# Patient Record
Sex: FEMALE | Race: WHITE
[De-identification: names, ages, dates, MRNs, and addresses within clinical notes are randomized per-mention and may not be internally consistent; named-entity substitution may affect disease eponyms.]

---

## 2017-01-16 ENCOUNTER — HOSPITAL ENCOUNTER (OUTPATIENT)
Dept: HOSPITAL 59 - HOP | Age: 67
Discharge: HOME | End: 2017-01-16
Attending: INTERNAL MEDICINE
Payer: COMMERCIAL

## 2017-01-16 DIAGNOSIS — Z86.010: Primary | ICD-10-CM

## 2017-01-16 DIAGNOSIS — I10: ICD-10-CM

## 2017-01-16 DIAGNOSIS — K57.30: ICD-10-CM

## 2017-01-16 DIAGNOSIS — D12.5: ICD-10-CM

## 2017-01-16 DIAGNOSIS — E78.00: ICD-10-CM

## 2017-01-16 DIAGNOSIS — D12.4: ICD-10-CM

## 2017-01-16 DIAGNOSIS — E03.9: ICD-10-CM

## 2017-01-16 DIAGNOSIS — D12.7: ICD-10-CM

## 2017-01-18 NOTE — OPERATIVE NOTE
DATE OF SURGERY:  01/16/2017.



REFERRING PHYSICIAN:  Analilia Montalvo D.O. 



PROCEDURE:  Colonoscopy to the cecum with cold biopsy forceps polypectomy times 
three.



INDICATION:  Prior history of adenomatous polyps.  The patient also has a 
strong family history of colon cancer in her father and her grandmother.



ANESTHESIA:  Intravenous sedation was administered by the Department of 
Anesthesiology and included Diprivan titrated to effect.



PROCEDURE:  Following informed consent from this alert individual, including a 
discussion of the risks and benefits of the procedure and an opportunity for 
the patient to ask questions, the patient was in the left lateral decubitus 
position.  A digital rectal exam was performed.  No abnormalities were noted.  
Following this, the Olympus PCF-180 video colonoscope was inserted into the 
rectum without resistance.  The rectal mucosa has a normal appearance with 
normal folds and distensibility.  The colonoscope was advanced up through the 
colon to the level of the cecum without much difficulty.  Throughout the bowel, 
the mucosa appeared normal, the folds were normal and the bowel was fairly well 
distensible.  Scattered diverticula were noted in the sigmoid colon.  The cecum 
was well defined by noting the appendiceal orifice and ileocecal valve.  
Retroflexion in the cecum was unremarkable.  The colon preparation was good.  
From the base of the cecum, the colonoscope was then withdrawn.  Again, 
diverticulosis was noted in the sigmoid colon.  There were also three 
diminutive 3.0 mm polyps noted in the descending colon, sigmoid colon, and 
rectum.  Each was removed with application of cold biopsy forceps.  
Retroflexion in the rectum was endoscopically normal.  The endoscope was 
straightened and withdrawn.  The patient tolerated the procedure well and was 
returned to the recovery area in stable condition.   



IMPRESSION: 

1.  Three diminutive 3.0 mm polyps removed from the descending colon, sigmoid 
colon, and rectum, each with biopsy forceps.

2.  Sigmoid diverticulosis. 



RECOMMENDATIONS:  

The patient was advised to have a recheck colonoscopy in three years' time for 
polyp surveillance and family history of colon cancer.  Follow up will also be 
with Dr. Analiila Montalvo.







________________________________   __________________________________

JIM CRAIG D.O.      Date      Time



cc:  ANUSHA Brewer

## 2019-09-04 ENCOUNTER — HOSPITAL ENCOUNTER (EMERGENCY)
Dept: HOSPITAL 59 - ER | Age: 69
Discharge: HOME | End: 2019-09-04
Payer: MEDICARE

## 2019-09-04 DIAGNOSIS — K57.92: Primary | ICD-10-CM

## 2019-09-04 DIAGNOSIS — I10: ICD-10-CM

## 2019-09-04 LAB
ABSOLUTE NEUTROPHIL COUNT: 5.51
ALBUMIN SERPL-MCNC: 4.2 G/DL (ref 4–5)
ALP SERPL-CCNC: 74 U/L (ref 35–104)
ALT SERPL-CCNC: 16 U/L (ref ?–33)
ANION GAP SERPL CALC-SCNC: 17 MMOL/L (ref 7–16)
APPEARANCE UR: (no result)
AST SERPL-CCNC: 20 U/L (ref 10–35)
BASOPHILS NFR BLD: 0.4 % (ref 0–6)
BILIRUB DIRECT SERPL-MCNC: < 0.2 MG/DL (ref 0–0.3)
BILIRUB SERPL-MCNC: 0.7 MG/DL (ref 0.2–1)
BILIRUB UR-MCNC: (no result) MG/DL
BUN SERPL-MCNC: 11 MG/DL (ref 8–23)
CO2 SERPL-SCNC: 25 MMOL/L (ref 22–29)
COLOR UR: (no result)
CREAT SERPL-MCNC: 0.5 MG/DL (ref 0.5–0.9)
EOSINOPHIL NFR BLD: 2.9 % (ref 0–6)
ERYTHROCYTE [DISTWIDTH] IN BLOOD BY AUTOMATED COUNT: 18.6 % (ref 11.5–14.5)
EST GLOMERULAR FILTRATION RATE: > 60 ML/MIN
GLUCOSE SERPL-MCNC: 93 MG/DL (ref 74–109)
GLUCOSE UR STRIP-MCNC: NEGATIVE MG/DL
GRANULOCYTES NFR BLD: 70 % (ref 47–80)
HCT VFR BLD CALC: 48.3 % (ref 35–47)
HGB BLD-MCNC: 14.9 GM/DL (ref 11.6–16)
KETONES UR QL STRIP: NEGATIVE
LIPASE SERPL-CCNC: 21 U/L (ref 13–60)
LYMPHOCYTES NFR BLD AUTO: 15.9 % (ref 16–45)
MCH RBC QN AUTO: 26.3 PG (ref 27–33)
MCHC RBC AUTO-ENTMCNC: 30.8 G/DL (ref 32–36)
MCV RBC AUTO: 85.3 FL (ref 81–97)
MONOCYTES NFR BLD: 10.8 % (ref 0–9)
NITRITE UR QL STRIP: NEGATIVE
PLATELET # BLD: 248 K/UL (ref 130–400)
PMV BLD AUTO: 10.9 FL (ref 7.4–10.4)
PROT SERPL-MCNC: 7.1 G/DL (ref 6.6–8.7)
PROT UR QL STRIP: (no result)
RBC # BLD AUTO: 5.66 M/UL (ref 3.8–5.4)
RBC # UR STRIP: NEGATIVE /UL
URINE LEUKOCYTE ESTERASE: NEGATIVE
UROBILINOGEN UR STRIP-ACNC: 0.2 E.U./DL (ref 0.2–1)
WBC # BLD AUTO: 7.9 K/UL (ref 4.2–12.2)

## 2019-09-04 PROCEDURE — 81003 URINALYSIS AUTO W/O SCOPE: CPT

## 2019-09-04 PROCEDURE — 80076 HEPATIC FUNCTION PANEL: CPT

## 2019-09-04 PROCEDURE — 80048 BASIC METABOLIC PNL TOTAL CA: CPT

## 2019-09-04 PROCEDURE — 74176 CT ABD & PELVIS W/O CONTRAST: CPT

## 2019-09-04 PROCEDURE — 99284 EMERGENCY DEPT VISIT MOD MDM: CPT

## 2019-09-04 PROCEDURE — 85025 COMPLETE CBC W/AUTO DIFF WBC: CPT

## 2019-09-04 PROCEDURE — 83690 ASSAY OF LIPASE: CPT

## 2019-09-04 NOTE — EMERGENCY DEPARTMENT RECORD
History of Present Illness





- General


Chief Complaint: Abdominal Pain


Stated Complaint: ABD  PAIN


Time Seen by Provider: 19 15:25


Source: Patient, RN notes reviewed


Mode of Arrival: Wheelchair





- History of Present Illness


Initial Comments: 


patient has left lower quad pain aug 6 and [facility] and given [facility]vaquin

and flagyl and was better and still had a twing of pain and started to get worse

a few days ago and she [facility] and [facility] the

ED today.  No vomiting and no diarrhea , no dysuria,  PSH Hysterectomy, History 

of diverticulitis and GI Dr is Dr Sutton . Primary is Dr Adams in [place]





Onset/Timin


-: Month(s)


Location: [facility]


Severity: Moderate


Severity scale (1-10): 8


Quality: Sharp


Consistency: Intermittent


Improves With: Rest


Worsens With: Movement


Associated Symptoms: Denies other symptoms





- Related Data


                                Home Medications











 Medication  Instructions  Recorded  Confirmed  Last Taken


 


Celecoxib 200 mg PO DAILY 19


 


Hydroxychloroquine Sulfate 200 mg PO DAILY 19





[Plaquenil]    








                                  Previous Rx's











 Medication  Instructions  Recorded


 


Ciprofloxacin HCl [Cipro] 500 mg PO Q12HR #20 tablet 19


 


Dicyclomine HCl [Bentyl] 10 mg PO Q8H #15 cap 19


 


Metronidazole [Flagyl] 500 mg PO Q8H #30 tablet 19











                                    Allergies











Allergy/AdvReac Type Severity Reaction Status Date / Time


 


No Known Drug Allergies Allergy   Verified 19 15:16














Travel Screening





- Travel/Exposure Within Last 30 Days


Have you traveled within the last 30 days?: No





- Travel/Exposure Within Last Year


Have you traveled outside the [place] in the last year?: No





- Additonal Travel Details


Have you been exposed to anyone with a communicable illness?: No





- Travel Symptoms


Symptom Screening: None





Past Medical History





- SOCIAL HISTORY


Smoking Status: Former smoker


Alcohol Use: None


Drug Use: Occasional


Drug Use Detail:: Marijuana





- RESPIRATORY


Hx Respiratory Disorders: Yes


Hx Asthma: Yes (very mild & allergy related)


Hx Sleep Apnea: Yes


Hx of CPAP: No


Comment:: dry cough





- CARDIOVASCULAR


Hx Cardio Disorders: Yes


Hx Hypertension: Yes


Hx Palpitations: Yes ("a few years ago" 24hr [place], stress, echo neg)


Comment:: high cholesterol





- NEURO


Hx Neuro Disorders: No





- GI


Hx GI Disorders: Yes


Hx Reflux: Yes


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


Hx of Polyps: Yes


Comment:: constipation





- 


Hx Genitourinary Disorders: No


Hx Bladder Problem:  ("urgency & frequency")





- ENDOCRINE


Hx Endocrine Disorders: Yes


Hx Thyroid Disease: Yes





- MUSCULOSKELETAL


Hx Musculoskeletal Disorders: Yes


Hx Arthritis: Yes


Hx Fibromyalgia: Yes





- PSYCH


Hx Psych Problems: No





- HEMATOLOGY/ONCOLOGY


Hx Hematology/Oncology Disorders: No





Family Medical History


Any Significant Family History?: Yes


Hx Cancer: Father, Mother, Grandparents


*Cancer Comment: colon, NHL, leukemia


Hx Dementia: Grandparents


Hx Diabetes: Father


Hx Heart Disease: Grandparents


*Heart Comment: CHF, MI


Hx Resp Disorders: Father


*Resp Comment: COPD





Course





                                   Vital Signs











  19





  15:22


 


Temperature 98.3 F


 


Pulse Rate 87


 


Respiratory 18





Rate 


 


Blood Pressure 140/103


 


Pulse Ox 95














- Reevaluation(s)


Reevaluation #1: 


talked about inpatient and outpatient therapy and she wants outpatient therapy 

and I told her what things to look for if it is worse and to return to the eD. 

More pain ,fever ,vomiting 


19 18:39








Medical Decision Making





- Lab Data


Result diagrams: 


                                 19 16:15





                                 19 16:15





Disposition


Clinical Impression: 


 Diverticulitis





Disposition: Home, Self-Care


Condition: (1) Good


Instructions:  Diverticulitis (ED)


Additional Instructions: 


clear liquids today 


low fiber diet tomorrow


Prescriptions: 


Ciprofloxacin HCl [Cipro] 500 mg PO Q12HR #20 tablet


Dicyclomine HCl [Bentyl] 10 mg PO Q8H #15 cap


Metronidazole [Flagyl] 500 mg PO Q8H #30 tablet


Forms:  Patient Portal Access


Time of Disposition: 18:41





Quality





- Quality Measures


Quality Measures: N/A





- Blood Pressure Screening


Does Patient Have Any of the Following: No


Blood Pressure Classification: Hypertensive Reading


Systolic Measurement: 140


Diastolic Measurement: 103


Screening for High Blood Pressure: < Pre-Hypertensive BP, F/U Documented > 

[]


Pre-Hypertensive Follow-up Interventions: Referral to alternative/primary care 

provider.

## 2019-09-06 NOTE — CT SCAN REPORT
EXAM:  CT OF THE ABDOMEN AND PELVIS WITHOUT CONTRAST 



HISTORY:  LEFT LOWER QUADRANT PAIN.  



TECHNIQUE:  Standard CT imaging of the abdomen and pelvis was obtained without 
contrast.  



Comparison:  10/22/10.  



FINDINGS:  The lung bases are clear.  The liver is unremarkable.  Large 
calcified gallstone.  No pericholecystic edema.  No peripancreatic fat 
stranding.  There is a diverticulum of the third portion of the duodenum.  The 
spleen is unremarkable.  The adrenal glands are normal.  There is a tiny 1 mm 
calcification in the left kidney.  No hydronephrosis.  A tiny hypodense lesion 
in the right kidney is too small to characterize, but likely a cyst.  



There is a focally inflamed diverticulum of the proximal sigmoid colon resulting
in focal colonic wall thickening and pericolonic fat stranding consistent with 
acute diverticulitis.  No definite free fluid, though evaluation for abscess is 
limited without intravenous contrast.  No free air is identified.  No small 
bowel dilatation.  The bladder is unremarkable.  The appendix is normal.  There 
is a fat containing supraumbilical ventral wall hernia.  There is a single 
retroperitoneal lymph node that is upper limits of normal measuring 10 mm in 
short axis diameter.  No destructive osseous lesion is identified.  



IMPRESSION:  

ACUTE SIGMOID DIVERTICULITIS.  



JOB NUMBER:  [id number]

[facility]